# Patient Record
Sex: FEMALE | Race: WHITE | NOT HISPANIC OR LATINO | URBAN - METROPOLITAN AREA
[De-identification: names, ages, dates, MRNs, and addresses within clinical notes are randomized per-mention and may not be internally consistent; named-entity substitution may affect disease eponyms.]

---

## 2017-06-03 ENCOUNTER — EMERGENCY (EMERGENCY)
Facility: HOSPITAL | Age: 77
LOS: 1 days | End: 2017-06-03
Payer: MEDICARE

## 2017-06-03 PROCEDURE — 74177 CT ABD & PELVIS W/CONTRAST: CPT | Mod: 26

## 2017-06-03 PROCEDURE — 99284 EMERGENCY DEPT VISIT MOD MDM: CPT

## 2017-12-30 ENCOUNTER — INPATIENT (INPATIENT)
Facility: HOSPITAL | Age: 77
LOS: 0 days | Discharge: HOME | End: 2017-12-31
Payer: MEDICARE

## 2017-12-30 ENCOUNTER — OUTPATIENT (OUTPATIENT)
Dept: OUTPATIENT SERVICES | Facility: HOSPITAL | Age: 77
LOS: 1 days | End: 2017-12-30

## 2017-12-30 PROCEDURE — 70450 CT HEAD/BRAIN W/O DYE: CPT | Mod: 26

## 2017-12-30 PROCEDURE — 99285 EMERGENCY DEPT VISIT HI MDM: CPT

## 2017-12-30 PROCEDURE — 71010: CPT | Mod: 26

## 2017-12-31 ENCOUNTER — OUTPATIENT (OUTPATIENT)
Dept: OUTPATIENT SERVICES | Facility: HOSPITAL | Age: 77
LOS: 1 days | End: 2017-12-31

## 2018-08-17 ENCOUNTER — OUTPATIENT (OUTPATIENT)
Dept: OUTPATIENT SERVICES | Facility: HOSPITAL | Age: 78
LOS: 1 days | End: 2018-08-17

## 2019-05-30 ENCOUNTER — APPOINTMENT (OUTPATIENT)
Dept: MAMMOGRAPHY | Facility: CLINIC | Age: 79
End: 2019-05-30

## 2019-06-13 ENCOUNTER — APPOINTMENT (OUTPATIENT)
Dept: ULTRASOUND IMAGING | Facility: CLINIC | Age: 79
End: 2019-06-13
Payer: MEDICARE

## 2019-06-13 ENCOUNTER — APPOINTMENT (OUTPATIENT)
Dept: RADIOLOGY | Facility: CLINIC | Age: 79
End: 2019-06-13

## 2019-06-13 ENCOUNTER — APPOINTMENT (OUTPATIENT)
Dept: MAMMOGRAPHY | Facility: CLINIC | Age: 79
End: 2019-06-13

## 2019-06-13 PROCEDURE — 76641 ULTRASOUND BREAST COMPLETE: CPT | Mod: 50

## 2019-06-13 PROCEDURE — 77080 DXA BONE DENSITY AXIAL: CPT

## 2019-06-13 PROCEDURE — 77063 BREAST TOMOSYNTHESIS BI: CPT

## 2019-06-13 PROCEDURE — 77067 SCR MAMMO BI INCL CAD: CPT

## 2019-10-28 ENCOUNTER — OUTPATIENT (OUTPATIENT)
Dept: OUTPATIENT SERVICES | Facility: HOSPITAL | Age: 79
LOS: 1 days | End: 2019-10-28

## 2019-12-26 ENCOUNTER — APPOINTMENT (OUTPATIENT)
Dept: RADIOLOGY | Facility: CLINIC | Age: 79
End: 2019-12-26
Payer: MEDICARE

## 2019-12-26 PROCEDURE — 71046 X-RAY EXAM CHEST 2 VIEWS: CPT

## 2019-12-26 PROCEDURE — 72074 X-RAY EXAM THORAC SPINE4/>VW: CPT

## 2020-05-29 ENCOUNTER — APPOINTMENT (OUTPATIENT)
Dept: RADIOLOGY | Facility: CLINIC | Age: 80
End: 2020-05-29
Payer: MEDICARE

## 2020-05-29 PROCEDURE — 72100 X-RAY EXAM L-S SPINE 2/3 VWS: CPT

## 2020-06-05 ENCOUNTER — APPOINTMENT (OUTPATIENT)
Dept: MAMMOGRAPHY | Facility: CLINIC | Age: 80
End: 2020-06-05
Payer: MEDICARE

## 2020-06-05 ENCOUNTER — APPOINTMENT (OUTPATIENT)
Dept: ULTRASOUND IMAGING | Facility: CLINIC | Age: 80
End: 2020-06-05

## 2020-06-05 PROCEDURE — 77067 SCR MAMMO BI INCL CAD: CPT

## 2020-06-05 PROCEDURE — 76641 ULTRASOUND BREAST COMPLETE: CPT | Mod: 50

## 2020-06-05 PROCEDURE — 77063 BREAST TOMOSYNTHESIS BI: CPT

## 2020-06-22 ENCOUNTER — OUTPATIENT (OUTPATIENT)
Dept: OUTPATIENT SERVICES | Facility: HOSPITAL | Age: 80
LOS: 1 days | End: 2020-06-22

## 2020-07-03 ENCOUNTER — APPOINTMENT (OUTPATIENT)
Dept: DISASTER EMERGENCY | Facility: CLINIC | Age: 80
End: 2020-07-03

## 2020-07-03 DIAGNOSIS — Z01.818 ENCOUNTER FOR OTHER PREPROCEDURAL EXAMINATION: ICD-10-CM

## 2020-07-04 LAB — SARS-COV-2 N GENE NPH QL NAA+PROBE: NOT DETECTED

## 2020-07-06 ENCOUNTER — OUTPATIENT (OUTPATIENT)
Dept: OUTPATIENT SERVICES | Facility: HOSPITAL | Age: 80
LOS: 1 days | End: 2020-07-06

## 2020-07-06 ENCOUNTER — INPATIENT (INPATIENT)
Facility: HOSPITAL | Age: 80
LOS: 2 days | Discharge: HOME HEALTH | End: 2020-07-09
Payer: MEDICARE

## 2020-07-06 PROCEDURE — 73560 X-RAY EXAM OF KNEE 1 OR 2: CPT | Mod: 26,RT

## 2020-07-07 ENCOUNTER — OUTPATIENT (OUTPATIENT)
Dept: OUTPATIENT SERVICES | Facility: HOSPITAL | Age: 80
LOS: 1 days | End: 2020-07-07

## 2020-07-08 ENCOUNTER — OUTPATIENT (OUTPATIENT)
Dept: OUTPATIENT SERVICES | Facility: HOSPITAL | Age: 80
LOS: 1 days | End: 2020-07-08

## 2020-07-09 ENCOUNTER — OUTPATIENT (OUTPATIENT)
Dept: OUTPATIENT SERVICES | Facility: HOSPITAL | Age: 80
LOS: 1 days | End: 2020-07-09

## 2020-08-11 ENCOUNTER — APPOINTMENT (OUTPATIENT)
Dept: ULTRASOUND IMAGING | Facility: CLINIC | Age: 80
End: 2020-08-11
Payer: MEDICARE

## 2020-08-11 PROCEDURE — 76642 ULTRASOUND BREAST LIMITED: CPT | Mod: LT

## 2020-11-12 ENCOUNTER — APPOINTMENT (OUTPATIENT)
Dept: ULTRASOUND IMAGING | Facility: CLINIC | Age: 80
End: 2020-11-12
Payer: MEDICARE

## 2020-11-12 ENCOUNTER — RESULT REVIEW (OUTPATIENT)
Age: 80
End: 2020-11-12

## 2020-11-12 PROCEDURE — 76770 US EXAM ABDO BACK WALL COMP: CPT

## 2021-01-16 ENCOUNTER — APPOINTMENT (OUTPATIENT)
Dept: RADIOLOGY | Facility: CLINIC | Age: 81
End: 2021-01-16
Payer: MEDICARE

## 2021-01-16 PROCEDURE — 71045 X-RAY EXAM CHEST 1 VIEW: CPT

## 2021-07-19 ENCOUNTER — APPOINTMENT (OUTPATIENT)
Dept: ULTRASOUND IMAGING | Facility: CLINIC | Age: 81
End: 2021-07-19

## 2021-07-19 ENCOUNTER — APPOINTMENT (OUTPATIENT)
Dept: MAMMOGRAPHY | Facility: CLINIC | Age: 81
End: 2021-07-19
Payer: MEDICARE

## 2021-07-19 PROCEDURE — 77063 BREAST TOMOSYNTHESIS BI: CPT

## 2021-07-19 PROCEDURE — 76641 ULTRASOUND BREAST COMPLETE: CPT | Mod: 50

## 2021-07-19 PROCEDURE — 77067 SCR MAMMO BI INCL CAD: CPT

## 2022-06-17 ENCOUNTER — NON-APPOINTMENT (OUTPATIENT)
Age: 82
End: 2022-06-17

## 2022-06-18 ENCOUNTER — EMERGENCY (EMERGENCY)
Facility: HOSPITAL | Age: 82
LOS: 1 days | Discharge: HOME | End: 2022-06-18
Admitting: EMERGENCY MEDICINE
Payer: MEDICARE

## 2022-06-18 DIAGNOSIS — M54.9 DORSALGIA, UNSPECIFIED: ICD-10-CM

## 2022-06-18 DIAGNOSIS — I12.9 HYPERTENSIVE CHRONIC KIDNEY DISEASE WITH STAGE 1 THROUGH STAGE 4 CHRONIC KIDNEY DISEASE, OR UNSPECIFIED CHRONIC KIDNEY DISEASE: ICD-10-CM

## 2022-06-18 DIAGNOSIS — R31.29 OTHER MICROSCOPIC HEMATURIA: ICD-10-CM

## 2022-06-18 DIAGNOSIS — N18.9 CHRONIC KIDNEY DISEASE, UNSPECIFIED: ICD-10-CM

## 2022-06-18 DIAGNOSIS — R10.9 UNSPECIFIED ABDOMINAL PAIN: ICD-10-CM

## 2022-06-18 PROCEDURE — 99284 EMERGENCY DEPT VISIT MOD MDM: CPT

## 2022-06-18 PROCEDURE — 74176 CT ABD & PELVIS W/O CONTRAST: CPT | Mod: 26,MH

## 2022-07-22 ENCOUNTER — APPOINTMENT (OUTPATIENT)
Dept: ULTRASOUND IMAGING | Facility: CLINIC | Age: 82
End: 2022-07-22

## 2022-07-22 ENCOUNTER — APPOINTMENT (OUTPATIENT)
Dept: MAMMOGRAPHY | Facility: CLINIC | Age: 82
End: 2022-07-22

## 2022-07-22 PROCEDURE — 76641 ULTRASOUND BREAST COMPLETE: CPT | Mod: 50

## 2022-07-22 PROCEDURE — 77067 SCR MAMMO BI INCL CAD: CPT

## 2022-07-22 PROCEDURE — 77063 BREAST TOMOSYNTHESIS BI: CPT

## 2022-09-01 ENCOUNTER — APPOINTMENT (OUTPATIENT)
Dept: RADIOLOGY | Facility: CLINIC | Age: 82
End: 2022-09-01

## 2022-09-01 PROCEDURE — 77080 DXA BONE DENSITY AXIAL: CPT

## 2022-11-11 ENCOUNTER — RX ONLY (RX ONLY)
Age: 82
End: 2022-11-11

## 2022-11-11 ENCOUNTER — OFFICE (OUTPATIENT)
Dept: URBAN - METROPOLITAN AREA CLINIC 38 | Facility: CLINIC | Age: 82
Setting detail: OPHTHALMOLOGY
End: 2022-11-11
Payer: MEDICARE

## 2022-11-11 DIAGNOSIS — H02.825: ICD-10-CM

## 2022-11-11 PROCEDURE — 92285 EXTERNAL OCULAR PHOTOGRAPHY: CPT | Performed by: OPHTHALMOLOGY

## 2022-11-11 PROCEDURE — 67840 REMOVE EYELID LESION: CPT | Performed by: OPHTHALMOLOGY

## 2022-11-11 ASSESSMENT — KERATOMETRY
OS_AXISANGLE_DEGREES: 099
OS_K2POWER_DIOPTERS: 42.75
OS_K1POWER_DIOPTERS: 42.25
OD_AXISANGLE_DEGREES: 090
METHOD_AUTO_MANUAL: AUTO
OD_K2POWER_DIOPTERS: 42.75
OD_K1POWER_DIOPTERS: 42.75

## 2022-11-11 ASSESSMENT — CONFRONTATIONAL VISUAL FIELD TEST (CVF)
OS_FINDINGS: FULL
OD_FINDINGS: FULL

## 2022-11-11 ASSESSMENT — REFRACTION_AUTOREFRACTION
OS_CYLINDER: -0.50
OD_CYLINDER: -0.75
OS_SPHERE: +0.75
OS_AXIS: 087
OD_SPHERE: +0.75
OD_AXIS: 088

## 2022-11-11 ASSESSMENT — SUPERFICIAL PUNCTATE KERATITIS (SPK)
OD_SPK: T 1+
OS_SPK: T 1+

## 2022-11-11 ASSESSMENT — AXIALLENGTH_DERIVED
OD_AL: 23.722
OS_AL: 23.7657

## 2022-11-11 ASSESSMENT — REFRACTION_CURRENTRX
OD_OVR_VA: 20/
OD_ADD: +2.50
OD_VPRISM_DIRECTION: SV
OS_VPRISM_DIRECTION: SV
OS_ADD: +2.50
OS_OVR_VA: 20/

## 2022-11-11 ASSESSMENT — LID POSITION - DERMATOCHALASIS
OD_DERMATOCHALASIS: T
OS_DERMATOCHALASIS: T

## 2022-11-11 ASSESSMENT — TEAR BREAK UP TIME (TBUT)
OS_TBUT: 10 SECS
OD_TBUT: 10 SECS

## 2022-11-11 ASSESSMENT — SPHEQUIV_DERIVED
OD_SPHEQUIV: 0.375
OS_SPHEQUIV: 0.5

## 2022-11-11 ASSESSMENT — VISUAL ACUITY
OD_BCVA: 20/25+
OS_BCVA: 20/25+2

## 2022-11-11 ASSESSMENT — LID EXAM ASSESSMENTS
OD_BLEPHARITIS: RLL RUL 1+ 2+
OS_BLEPHARITIS: LLL LUL 1+ 2+

## 2022-11-22 ENCOUNTER — OFFICE (OUTPATIENT)
Dept: URBAN - METROPOLITAN AREA CLINIC 100 | Facility: CLINIC | Age: 82
Setting detail: OPHTHALMOLOGY
End: 2022-11-22
Payer: MEDICARE

## 2022-11-22 VITALS — HEIGHT: 55 IN

## 2022-11-22 DIAGNOSIS — H00.015: ICD-10-CM

## 2022-11-22 DIAGNOSIS — H02.825: ICD-10-CM

## 2022-11-22 PROBLEM — H01.002 BLEPHARITIS; RIGHT UPPER LID, RIGHT LOWER LID, LEFT UPPER LID, LEFT LOWER LID: Status: ACTIVE | Noted: 2022-11-22

## 2022-11-22 PROBLEM — H01.001 BLEPHARITIS; RIGHT UPPER LID, RIGHT LOWER LID, LEFT UPPER LID, LEFT LOWER LID: Status: ACTIVE | Noted: 2022-11-22

## 2022-11-22 PROBLEM — H02.83 DERMATOCHALASIS; RIGHT UPPER LID, LEFT UPPER LID: Status: ACTIVE | Noted: 2022-11-11

## 2022-11-22 PROBLEM — H01.005 BLEPHARITIS; RIGHT UPPER LID, RIGHT LOWER LID, LEFT UPPER LID, LEFT LOWER LID: Status: ACTIVE | Noted: 2022-11-22

## 2022-11-22 PROBLEM — H01.004 BLEPHARITIS; RIGHT UPPER LID, RIGHT LOWER LID, LEFT UPPER LID, LEFT LOWER LID: Status: ACTIVE | Noted: 2022-11-22

## 2022-11-22 PROCEDURE — 99213 OFFICE O/P EST LOW 20 MIN: CPT | Performed by: OPHTHALMOLOGY

## 2022-11-22 ASSESSMENT — KERATOMETRY
OD_K1POWER_DIOPTERS: 42.75
OS_K2POWER_DIOPTERS: 42.75
OD_K2POWER_DIOPTERS: 42.75
OD_AXISANGLE_DEGREES: 090
OS_K1POWER_DIOPTERS: 42.25
OS_AXISANGLE_DEGREES: 099
METHOD_AUTO_MANUAL: AUTO

## 2022-11-22 ASSESSMENT — VISUAL ACUITY
OD_BCVA: 20/30
OS_BCVA: 20/25-1

## 2022-11-22 ASSESSMENT — SPHEQUIV_DERIVED
OD_SPHEQUIV: 0.375
OS_SPHEQUIV: 0.5

## 2022-11-22 ASSESSMENT — REFRACTION_AUTOREFRACTION
OD_AXIS: 088
OD_CYLINDER: -0.75
OS_AXIS: 087
OD_SPHERE: +0.75
OS_CYLINDER: -0.50
OS_SPHERE: +0.75

## 2022-11-22 ASSESSMENT — AXIALLENGTH_DERIVED
OS_AL: 23.7657
OD_AL: 23.722

## 2022-11-22 ASSESSMENT — CONFRONTATIONAL VISUAL FIELD TEST (CVF)
OS_FINDINGS: FULL
OD_FINDINGS: FULL

## 2022-11-22 ASSESSMENT — TEAR BREAK UP TIME (TBUT)
OS_TBUT: 10 SECS
OD_TBUT: 10 SECS

## 2022-11-22 ASSESSMENT — LID EXAM ASSESSMENTS
OS_BLEPHARITIS: LLL LUL 1+ 2+
OD_BLEPHARITIS: RLL RUL 1+ 2+

## 2022-11-22 ASSESSMENT — SUPERFICIAL PUNCTATE KERATITIS (SPK)
OS_SPK: T 1+
OD_SPK: T 1+

## 2022-11-22 ASSESSMENT — LID POSITION - DERMATOCHALASIS
OS_DERMATOCHALASIS: T
OD_DERMATOCHALASIS: T

## 2022-12-15 PROBLEM — H16.222 DRY EYE SYNDROME K SICCA; LEFT EYE: Status: ACTIVE | Noted: 2022-12-15

## 2023-02-16 ENCOUNTER — OFFICE (OUTPATIENT)
Dept: URBAN - METROPOLITAN AREA CLINIC 38 | Facility: CLINIC | Age: 83
Setting detail: OPHTHALMOLOGY
End: 2023-02-16
Payer: MEDICARE

## 2023-02-16 DIAGNOSIS — H02.831: ICD-10-CM

## 2023-02-16 DIAGNOSIS — H02.834: ICD-10-CM

## 2023-02-16 DIAGNOSIS — Z96.1: ICD-10-CM

## 2023-02-16 DIAGNOSIS — H35.033: ICD-10-CM

## 2023-02-16 DIAGNOSIS — H16.223: ICD-10-CM

## 2023-02-16 PROCEDURE — 92014 COMPRE OPH EXAM EST PT 1/>: CPT | Performed by: OPHTHALMOLOGY

## 2023-02-16 ASSESSMENT — SPHEQUIV_DERIVED
OS_SPHEQUIV: 0.5
OD_SPHEQUIV: 0.375

## 2023-02-16 ASSESSMENT — REFRACTION_CURRENTRX
OD_OVR_VA: 20/
OD_SPHERE: PLANO
OS_SPHERE: PLANO
OD_ADD: +2.50
OS_OVR_VA: 20/
OS_VPRISM_DIRECTION: BF
OS_ADD: +2.50
OS_CYLINDER: SPHERE
OD_VPRISM_DIRECTION: BF
OD_CYLINDER: SPHERE

## 2023-02-16 ASSESSMENT — REFRACTION_MANIFEST
OU_VA: 20/20
OS_ADD: +2.75
OD_SPHERE: PLANO
OD_VA1: 20/20-
OS_SPHERE: PLANO
OS_CYLINDER: SPHERE
OD_CYLINDER: SPHERE
OS_VA2: 20/25
OD_CYLINDER: SPHERE
OS_VA1: 20/20
OD_ADD: +2.75
OS_CYLINDER: SPHERE
OD_ADD: +2.75
OD_SPHERE: PLANO
OD_VA2: 20/25
OD_VA1: 20/20-
OS_VA1: 20/20
OS_VA2: 20/25
OU_VA: 20/20
OS_SPHERE: PLANO
OD_VA2: 20/25
OS_ADD: +2.75

## 2023-02-16 ASSESSMENT — LID EXAM ASSESSMENTS
OD_BLEPHARITIS: RLL RUL 1+ 2+
OS_BLEPHARITIS: LLL LUL 1+ 2+

## 2023-02-16 ASSESSMENT — KERATOMETRY
METHOD_AUTO_MANUAL: AUTO
OS_K2POWER_DIOPTERS: 42.75
OS_AXISANGLE_DEGREES: 074
OD_K2POWER_DIOPTERS: 2.75
OS_K1POWER_DIOPTERS: 42.25
OD_K1POWER_DIOPTERS: 42.75
OD_AXISANGLE_DEGREES: 090

## 2023-02-16 ASSESSMENT — REFRACTION_AUTOREFRACTION
OS_CYLINDER: -0.50
OD_CYLINDER: -0.75
OD_AXIS: 087
OS_AXIS: 090
OD_SPHERE: +0.75
OS_SPHERE: +0.75

## 2023-02-16 ASSESSMENT — TONOMETRY
OD_IOP_MMHG: 16
OS_IOP_MMHG: 16

## 2023-02-16 ASSESSMENT — CONFRONTATIONAL VISUAL FIELD TEST (CVF)
OS_FINDINGS: FULL
OD_FINDINGS: FULL

## 2023-02-16 ASSESSMENT — TEAR BREAK UP TIME (TBUT)
OD_TBUT: 6-8 SECS
OS_TBUT: 6-8 SECS

## 2023-02-16 ASSESSMENT — VISUAL ACUITY
OD_BCVA: 20/20
OS_BCVA: 20/20-3

## 2023-02-16 ASSESSMENT — SUPERFICIAL PUNCTATE KERATITIS (SPK)
OS_SPK: T
OD_SPK: T

## 2023-02-16 ASSESSMENT — AXIALLENGTH_DERIVED
OD_AL: 34.535
OS_AL: 23.7657

## 2023-02-16 ASSESSMENT — LID POSITION - DERMATOCHALASIS
OS_DERMATOCHALASIS: T
OD_DERMATOCHALASIS: T

## 2023-03-01 ENCOUNTER — APPOINTMENT (OUTPATIENT)
Dept: ULTRASOUND IMAGING | Facility: CLINIC | Age: 83
End: 2023-03-01
Payer: MEDICARE

## 2023-03-01 PROCEDURE — 76856 US EXAM PELVIC COMPLETE: CPT

## 2023-06-22 ENCOUNTER — APPOINTMENT (OUTPATIENT)
Dept: CT IMAGING | Facility: CLINIC | Age: 83
End: 2023-06-22
Payer: MEDICARE

## 2023-06-22 PROCEDURE — 74176 CT ABD & PELVIS W/O CONTRAST: CPT | Mod: MH

## 2023-07-12 ENCOUNTER — RESULT REVIEW (OUTPATIENT)
Age: 83
End: 2023-07-12

## 2023-07-12 ENCOUNTER — APPOINTMENT (OUTPATIENT)
Dept: UROLOGY | Facility: CLINIC | Age: 83
End: 2023-07-12
Payer: MEDICARE

## 2023-07-12 VITALS
BODY MASS INDEX: 26.81 KG/M2 | HEIGHT: 61 IN | HEART RATE: 86 BPM | WEIGHT: 142 LBS | DIASTOLIC BLOOD PRESSURE: 82 MMHG | SYSTOLIC BLOOD PRESSURE: 138 MMHG | TEMPERATURE: 93.5 F

## 2023-07-12 DIAGNOSIS — K21.9 GASTRO-ESOPHAGEAL REFLUX DISEASE W/OUT ESOPHAGITIS: ICD-10-CM

## 2023-07-12 DIAGNOSIS — Z86.39 PERSONAL HISTORY OF OTHER ENDOCRINE, NUTRITIONAL AND METABOLIC DISEASE: ICD-10-CM

## 2023-07-12 DIAGNOSIS — Z85.828 PERSONAL HISTORY OF OTHER MALIGNANT NEOPLASM OF SKIN: ICD-10-CM

## 2023-07-12 DIAGNOSIS — N39.0 URINARY TRACT INFECTION, SITE NOT SPECIFIED: ICD-10-CM

## 2023-07-12 DIAGNOSIS — R73.03 PREDIABETES.: ICD-10-CM

## 2023-07-12 DIAGNOSIS — Z78.9 OTHER SPECIFIED HEALTH STATUS: ICD-10-CM

## 2023-07-12 DIAGNOSIS — D72.819 DECREASED WHITE BLOOD CELL COUNT, UNSPECIFIED: ICD-10-CM

## 2023-07-12 DIAGNOSIS — Z80.0 FAMILY HISTORY OF MALIGNANT NEOPLASM OF DIGESTIVE ORGANS: ICD-10-CM

## 2023-07-12 DIAGNOSIS — Z86.79 PERSONAL HISTORY OF OTHER DISEASES OF THE CIRCULATORY SYSTEM: ICD-10-CM

## 2023-07-12 PROCEDURE — 99203 OFFICE O/P NEW LOW 30 MIN: CPT

## 2023-07-12 RX ORDER — FENOFIBRATE 160 MG/1
160 TABLET ORAL
Refills: 0 | Status: ACTIVE | COMMUNITY

## 2023-07-12 RX ORDER — HYDROCHLOROTHIAZIDE 12.5 MG/1
12.5 CAPSULE ORAL
Refills: 0 | Status: ACTIVE | COMMUNITY

## 2023-07-12 RX ORDER — TELMISARTAN 40 MG/1
40 TABLET ORAL
Refills: 0 | Status: ACTIVE | COMMUNITY

## 2023-07-12 RX ORDER — FAMOTIDINE 40 MG/1
40 TABLET, FILM COATED ORAL
Refills: 0 | Status: ACTIVE | COMMUNITY

## 2023-07-12 RX ORDER — BACILLUS COAGULANS/INULIN 1B-250 MG
CAPSULE ORAL
Refills: 0 | Status: ACTIVE | COMMUNITY

## 2023-07-12 RX ORDER — MULTIVIT-MIN/IRON/FOLIC ACID/K 18-600-40
CAPSULE ORAL
Refills: 0 | Status: ACTIVE | COMMUNITY

## 2023-07-12 NOTE — ASSESSMENT
[FreeTextEntry1] : Hematuria:\par 2 neg cystoscopies\par will obtain cystoscopy\par MR urogram\par cytology\par if all neg, advised to f/u with gynecology

## 2023-07-12 NOTE — HISTORY OF PRESENT ILLNESS
[FreeTextEntry1] : 83yo F hx of CKD III, hematuria presents to establish care\par She states she has 2 cystoscopies with Dr. Enriquez for hematuria, last cystoscopy 2022 - neg findings\par Pt also had CT abd/pel no IV contrast on 6/2023: didn't show any stones or hydro.\par She noted blood after wiping. Didn't notice any in toilet bowl\par UCx neg in june 2023

## 2023-07-14 LAB — URINE CYTOLOGY: NORMAL

## 2023-07-27 ENCOUNTER — APPOINTMENT (OUTPATIENT)
Dept: OBGYN | Facility: CLINIC | Age: 83
End: 2023-07-27
Payer: MEDICARE

## 2023-07-27 VITALS
DIASTOLIC BLOOD PRESSURE: 88 MMHG | HEIGHT: 61 IN | BODY MASS INDEX: 26.81 KG/M2 | WEIGHT: 142 LBS | SYSTOLIC BLOOD PRESSURE: 128 MMHG

## 2023-07-27 DIAGNOSIS — Z01.419 ENCOUNTER FOR GYNECOLOGICAL EXAMINATION (GENERAL) (ROUTINE) W/OUT ABNORMAL FINDINGS: ICD-10-CM

## 2023-07-27 DIAGNOSIS — N83.201 UNSPECIFIED OVARIAN CYST, RIGHT SIDE: ICD-10-CM

## 2023-07-27 DIAGNOSIS — N90.89 OTHER SPECIFIED NONINFLAMMATORY DISORDERS OF VULVA AND PERINEUM: ICD-10-CM

## 2023-07-27 PROCEDURE — 99203 OFFICE O/P NEW LOW 30 MIN: CPT | Mod: 25

## 2023-07-27 PROCEDURE — 36415 COLL VENOUS BLD VENIPUNCTURE: CPT

## 2023-07-27 PROCEDURE — G0101: CPT

## 2023-07-27 RX ORDER — CLOBETASOL PROPIONATE 0.5 MG/G
0.05 CREAM TOPICAL TWICE DAILY
Qty: 1 | Refills: 1 | Status: ACTIVE | COMMUNITY
Start: 2023-07-27 | End: 1900-01-01

## 2023-07-27 NOTE — DISCUSSION/SUMMARY
[FreeTextEntry1] : 82 year old PMF here for wwe, vulvar irritation, r ov cysts\par -  f/u mri, ova 1 plus done today\par - cervical cancer screening not needed\par - sbe reveiwed, Mammo/sono RX given\par - Dexa UTD, vitamin D and calcium, weight bearing exercises recommended\par - colonoscopyUTD\par - clobetasol for 2 weeks\par - n/v 1 year for annual or prn\par - postmenopausal bleeding precautions given\par

## 2023-07-27 NOTE — PHYSICAL EXAM
[Appropriately responsive] : appropriately responsive [Alert] : alert [No Acute Distress] : no acute distress [No Lymphadenopathy] : no lymphadenopathy [Regular Rate Rhythm] : regular rate rhythm [Soft] : soft [Non-tender] : non-tender [Non-distended] : non-distended [No HSM] : No HSM [No Lesions] : no lesions [No Mass] : no mass [Oriented x3] : oriented x3 [Examination Of The Breasts] : a normal appearance [No Masses] : no breast masses were palpable [Labia Majora Erythema] : erythema [Labia Minora] : normal [Normal] : normal [Absent] : absent [Uterine Adnexae] : normal

## 2023-08-01 ENCOUNTER — OUTPATIENT (OUTPATIENT)
Dept: OUTPATIENT SERVICES | Facility: HOSPITAL | Age: 83
LOS: 1 days | End: 2023-08-01
Payer: MEDICARE

## 2023-08-01 ENCOUNTER — APPOINTMENT (OUTPATIENT)
Dept: MRI IMAGING | Facility: CLINIC | Age: 83
End: 2023-08-01
Payer: MEDICARE

## 2023-08-01 DIAGNOSIS — R31.0 GROSS HEMATURIA: ICD-10-CM

## 2023-08-01 PROCEDURE — 72197 MRI PELVIS W/O & W/DYE: CPT | Mod: 26,MH

## 2023-08-01 PROCEDURE — 74183 MRI ABD W/O CNTR FLWD CNTR: CPT | Mod: 26,MH

## 2023-08-01 PROCEDURE — A9585: CPT

## 2023-08-01 PROCEDURE — 72197 MRI PELVIS W/O & W/DYE: CPT

## 2023-08-01 PROCEDURE — 74183 MRI ABD W/O CNTR FLWD CNTR: CPT

## 2023-08-02 ENCOUNTER — RESULT REVIEW (OUTPATIENT)
Age: 83
End: 2023-08-02

## 2023-08-02 ENCOUNTER — APPOINTMENT (OUTPATIENT)
Dept: MAMMOGRAPHY | Facility: CLINIC | Age: 83
End: 2023-08-02
Payer: MEDICARE

## 2023-08-02 ENCOUNTER — TRANSCRIPTION ENCOUNTER (OUTPATIENT)
Age: 83
End: 2023-08-02

## 2023-08-02 ENCOUNTER — APPOINTMENT (OUTPATIENT)
Dept: ULTRASOUND IMAGING | Facility: CLINIC | Age: 83
End: 2023-08-02
Payer: MEDICARE

## 2023-08-02 LAB
CA125 (BIOREF): 9.6
DISCLAIMER (BIOREF): NORMAL
METHODSANDLIMITATIONS: NORMAL
OVA1PLUS: NORMAL
REFERENCES: NORMAL
RISK OF MALIGNANCY POSTMENOPAUSAL/HIGH RISK ULTRASOUND: NORMAL
RISK OF MALIGNANCY POSTMENOPAUSAL/LOW RISK ULTRASOUND: NORMAL
RISK OF MALIGNANCY PREMENOPAUSAL/HIGH RISK ULTRASOUND: NORMAL
RISK OF MALIGNANCY PREMENOPAUSAL/LOW RISK ULTRASOUND: NORMAL

## 2023-08-02 PROCEDURE — 76641 ULTRASOUND BREAST COMPLETE: CPT | Mod: 50,GY

## 2023-08-02 PROCEDURE — 77067 SCR MAMMO BI INCL CAD: CPT

## 2023-08-02 PROCEDURE — 77063 BREAST TOMOSYNTHESIS BI: CPT

## 2023-08-08 ENCOUNTER — APPOINTMENT (OUTPATIENT)
Dept: UROLOGY | Facility: CLINIC | Age: 83
End: 2023-08-08
Payer: MEDICARE

## 2023-08-08 VITALS
HEART RATE: 103 BPM | WEIGHT: 142 LBS | TEMPERATURE: 97.3 F | HEIGHT: 61 IN | DIASTOLIC BLOOD PRESSURE: 76 MMHG | BODY MASS INDEX: 26.81 KG/M2 | SYSTOLIC BLOOD PRESSURE: 124 MMHG

## 2023-08-08 DIAGNOSIS — R31.0 GROSS HEMATURIA: ICD-10-CM

## 2023-08-08 PROCEDURE — 52000 CYSTOURETHROSCOPY: CPT

## 2023-08-08 RX ORDER — CIPROFLOXACIN HYDROCHLORIDE 500 MG/1
500 TABLET, FILM COATED ORAL
Qty: 1 | Refills: 0 | Status: ACTIVE | COMMUNITY
Start: 2023-08-08

## 2023-08-08 RX ORDER — CIPROFLOXACIN HYDROCHLORIDE 500 MG/1
500 TABLET, FILM COATED ORAL
Refills: 0 | Status: COMPLETED | OUTPATIENT
Start: 2023-08-08

## 2023-08-08 RX ADMIN — Medication 0 MG: at 00:00

## 2023-08-24 ENCOUNTER — APPOINTMENT (OUTPATIENT)
Dept: ULTRASOUND IMAGING | Facility: CLINIC | Age: 83
End: 2023-08-24
Payer: MEDICARE

## 2023-08-24 PROCEDURE — 93971 EXTREMITY STUDY: CPT | Mod: LT

## 2023-09-15 ASSESSMENT — KOOS JR
IMPORTED KOOS JR SCORE: 100.0
STANDING UPRIGHT: MILD
KOOS JR RAW SCORE: 3
IMPORTED KOOS JR SCORE: 76.33
KOOS JR RAW SCORE: 17
HOW SEVERE IS YOUR KNEE STIFFNESS AFTER FIRST WAKING IN MORNING: SEVERE
GOING UP OR DOWN STAIRS: MILD
KOOS JR RAW SCORE: 17
RISING FROM SITTING: MODERATE
IMPORTED FORM: YES
BENDING TO THE FLOOR TO PICK UP OBJECT: MODERATE
KOOS JR RAW SCORE: 0
HOW SEVERE IS YOUR KNEE STIFFNESS AFTER FIRST WAKING IN MORNING: MILD
IMPORTED FORM: YES
RISING FROM SITTING: MODERATE
TWISING OR PIVOTING ON KNEE: MILD
RISING FROM SITTING: MILD
HOW SEVERE IS YOUR KNEE STIFFNESS AFTER FIRST WAKING IN MORNING: SEVERE
IMPORTED LATERALITY: RIGHT
TWISING OR PIVOTING ON KNEE: EXTREME
IMPORTED LATERALITY: RIGHT
TWISING OR PIVOTING ON KNEE: EXTREME
HOW SEVERE IS YOUR KNEE STIFFNESS AFTER FIRST WAKING IN MORNING: MILD
KOOS JR RAW SCORE: 0
KOOS JR RAW SCORE: 4
GOING UP OR DOWN STAIRS: SEVERE
IMPORTED FORM: YES
BENDING TO THE FLOOR TO PICK UP OBJECT: MODERATE
IMPORTED FORM: YES
IMPORTED LATERALITY: RIGHT
IMPORTED KOOS JR SCORE: 79.91
RISING FROM SITTING: MILD
IMPORTED KOOS JR SCORE: 79.91
STRAIGHTENING KNEE FULLY: MODERATE
IMPORTED KOOS JR SCORE: 44.9
GOING UP OR DOWN STAIRS: MILD
KOOS JR RAW SCORE: 4
TWISING OR PIVOTING ON KNEE: MILD
STANDING UPRIGHT: MILD
IMPORTED KOOS JR SCORE: 100.0
IMPORTED KOOS JR SCORE: 44.9
GOING UP OR DOWN STAIRS: SEVERE
IMPORTED LATERALITY: RIGHT
STRAIGHTENING KNEE FULLY: MODERATE
KOOS JR RAW SCORE: 3
IMPORTED KOOS JR SCORE: 76.33

## 2024-02-15 ENCOUNTER — RX ONLY (RX ONLY)
Age: 84
End: 2024-02-15

## 2024-02-15 ENCOUNTER — OFFICE (OUTPATIENT)
Dept: URBAN - METROPOLITAN AREA CLINIC 38 | Facility: CLINIC | Age: 84
Setting detail: OPHTHALMOLOGY
End: 2024-02-15
Payer: MEDICARE

## 2024-02-15 DIAGNOSIS — H90.3: ICD-10-CM

## 2024-02-15 DIAGNOSIS — H01.001: ICD-10-CM

## 2024-02-15 DIAGNOSIS — H02.834: ICD-10-CM

## 2024-02-15 DIAGNOSIS — H01.005: ICD-10-CM

## 2024-02-15 DIAGNOSIS — H16.223: ICD-10-CM

## 2024-02-15 DIAGNOSIS — H52.4: ICD-10-CM

## 2024-02-15 DIAGNOSIS — H35.033: ICD-10-CM

## 2024-02-15 DIAGNOSIS — H01.002: ICD-10-CM

## 2024-02-15 DIAGNOSIS — H02.831: ICD-10-CM

## 2024-02-15 DIAGNOSIS — H01.004: ICD-10-CM

## 2024-02-15 DIAGNOSIS — Z96.1: ICD-10-CM

## 2024-02-15 PROCEDURE — 92015 DETERMINE REFRACTIVE STATE: CPT | Performed by: OPHTHALMOLOGY

## 2024-02-15 PROCEDURE — 92557 COMPREHENSIVE HEARING TEST: CPT | Mod: AB | Performed by: AUDIOLOGIST

## 2024-02-15 PROCEDURE — 92567 TYMPANOMETRY: CPT | Mod: AB | Performed by: AUDIOLOGIST

## 2024-02-15 PROCEDURE — 92014 COMPRE OPH EXAM EST PT 1/>: CPT | Performed by: OPHTHALMOLOGY

## 2024-02-15 ASSESSMENT — REFRACTION_MANIFEST
OD_CYLINDER: -0.50
OS_CYLINDER: -0.50
OD_ADD: +2.75
OS_SPHERE: +0.50
OS_SPHERE: +0.75
OS_VA2: 20/20(J1+)
OD_VA1: 20/20
OD_VA1: 20/20
OD_SPHERE: +0.25
OU_VA: 20/20
OS_ADD: +2.75
OD_VA2: 20/20(J1+)
OD_ADD: +2.75
OD_CYLINDER: -0.50
OU_VA: 20/20
OD_SPHERE: +0.25
OS_VA1: 20/20
OS_AXIS: 085
OD_AXIS: 090
OS_VA1: 20/20
OD_VA2: 20/20(J1+)
OS_AXIS: 085
OD_AXIS: 090
OS_ADD: +2.75
OS_CYLINDER: -0.50
OS_VA2: 20/20(J1+)

## 2024-02-15 ASSESSMENT — LID EXAM ASSESSMENTS
OS_BLEPHARITIS: LLL LUL 1+ 2+
OD_BLEPHARITIS: RLL RUL 1+ 2+

## 2024-02-15 ASSESSMENT — TEAR BREAK UP TIME (TBUT)
OS_TBUT: 6-8 SECS
OD_TBUT: 6-8 SECS

## 2024-02-15 ASSESSMENT — REFRACTION_CURRENTRX
OD_SPHERE: PLANO
OS_OVR_VA: 20/
OD_OVR_VA: 20/
OS_CYLINDER: -0.25
OS_VPRISM_DIRECTION: BF
OD_VPRISM_DIRECTION: BF
OS_AXIS: 148
OS_ADD: +3.00
OD_ADD: +3.00
OS_SPHERE: +0.25

## 2024-02-15 ASSESSMENT — SPHEQUIV_DERIVED
OS_SPHEQUIV: 0.5
OS_SPHEQUIV: 0.25
OD_SPHEQUIV: 0
OD_SPHEQUIV: 0.125
OS_SPHEQUIV: 0.5
OD_SPHEQUIV: 0

## 2024-02-15 ASSESSMENT — SUPERFICIAL PUNCTATE KERATITIS (SPK)
OS_SPK: T
OD_SPK: T

## 2024-02-15 ASSESSMENT — REFRACTION_AUTOREFRACTION
OD_AXIS: 091
OS_SPHERE: +0.75
OS_AXIS: 087
OS_CYLINDER: -0.50
OD_CYLINDER: -0.75
OD_SPHERE: +0.50

## 2024-02-15 ASSESSMENT — LID POSITION - DERMATOCHALASIS
OS_DERMATOCHALASIS: T
OD_DERMATOCHALASIS: T

## 2024-02-15 ASSESSMENT — CONFRONTATIONAL VISUAL FIELD TEST (CVF)
OD_FINDINGS: FULL
OS_FINDINGS: FULL

## 2024-07-23 ENCOUNTER — APPOINTMENT (OUTPATIENT)
Dept: UROLOGY | Facility: CLINIC | Age: 84
End: 2024-07-23
Payer: MEDICARE

## 2024-07-23 VITALS
WEIGHT: 142 LBS | SYSTOLIC BLOOD PRESSURE: 157 MMHG | HEIGHT: 61 IN | DIASTOLIC BLOOD PRESSURE: 87 MMHG | BODY MASS INDEX: 26.81 KG/M2 | TEMPERATURE: 97.6 F | HEART RATE: 80 BPM

## 2024-07-23 DIAGNOSIS — N36.2 URETHRAL CARUNCLE: ICD-10-CM

## 2024-07-23 DIAGNOSIS — R31.0 GROSS HEMATURIA: ICD-10-CM

## 2024-07-23 PROCEDURE — 99213 OFFICE O/P EST LOW 20 MIN: CPT

## 2024-07-23 RX ORDER — ESTRADIOL 0.1 MG/G
0.1 CREAM VAGINAL
Qty: 1 | Refills: 1 | Status: ACTIVE | COMMUNITY
Start: 2024-07-23 | End: 1900-01-01

## 2024-07-23 NOTE — HISTORY OF PRESENT ILLNESS
[FreeTextEntry1] : 82yo F hx of CKD III, hematuria presents for f/u She states she had another episode of hematuria 6/2024. UCx was neg.   Neg cysto 8/2023: showed urethral caruncle She states she has 2 cystoscopies with Dr. Enriquez for hematuria, last cystoscopy 2022 - neg findings Pt also had CT abd/pel no IV contrast on 6/2023: didn't show any stones or hydro. She noted blood after wiping. Didn't notice any in toilet bowl

## 2024-07-23 NOTE — ASSESSMENT
[FreeTextEntry1] : Hematuria: last neg cysto 8/2023 discussed likely due to urethral caruncle will prescribe estrace cream to apply once a week

## 2024-07-26 LAB — URINE CYTOLOGY: NORMAL

## 2024-07-30 ENCOUNTER — APPOINTMENT (OUTPATIENT)
Dept: ULTRASOUND IMAGING | Facility: CLINIC | Age: 84
End: 2024-07-30
Payer: MEDICARE

## 2024-07-30 PROCEDURE — 93971 EXTREMITY STUDY: CPT | Mod: LT

## 2024-08-20 ENCOUNTER — APPOINTMENT (OUTPATIENT)
Dept: UROLOGY | Facility: CLINIC | Age: 84
End: 2024-08-20
Payer: MEDICARE

## 2024-08-20 VITALS
HEART RATE: 92 BPM | BODY MASS INDEX: 26.81 KG/M2 | TEMPERATURE: 97.3 F | WEIGHT: 142 LBS | HEIGHT: 61 IN | SYSTOLIC BLOOD PRESSURE: 155 MMHG | DIASTOLIC BLOOD PRESSURE: 81 MMHG

## 2024-08-20 DIAGNOSIS — N39.0 URINARY TRACT INFECTION, SITE NOT SPECIFIED: ICD-10-CM

## 2024-08-20 DIAGNOSIS — N36.2 URETHRAL CARUNCLE: ICD-10-CM

## 2024-08-20 LAB
APPEARANCE: CLEAR
BILIRUBIN URINE: NEGATIVE
BLOOD URINE: NEGATIVE
COLOR: YELLOW
GLUCOSE QUALITATIVE U: NEGATIVE
KETONES URINE: NEGATIVE
LEUKOCYTE ESTERASE URINE: NEGATIVE
NITRITE URINE: NEGATIVE
PH URINE: 7
PROTEIN URINE: NEGATIVE
SPECIFIC GRAVITY URINE: 1.01
UROBILINOGEN URINE: 0.2 (ref 0.2–?)

## 2024-08-20 PROCEDURE — 99214 OFFICE O/P EST MOD 30 MIN: CPT

## 2024-08-20 NOTE — ASSESSMENT
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 Advised to start using 3x/week which can also help with UTIs

## 2024-08-20 NOTE — HISTORY OF PRESENT ILLNESS
[FreeTextEntry1] : 84yo F hx of CKD III, hematuria presents for f/u SHe wants to r/o UTI She had another UTI earlier this month. Had little hematuria and discomfort   Neg cysto 8/2023: showed urethral caruncle She states she has 2 cystoscopies with Dr. Enriquez for hematuria, last cystoscopy 2022 - neg findings Pt also had CT abd/pel no IV contrast on 6/2023: didn't show any stones or hydro. She noted blood after wiping. Didn't notice any in toilet bowl

## 2024-08-26 LAB — BACTERIA UR CULT: ABNORMAL

## 2024-08-26 RX ORDER — AMOXICILLIN AND CLAVULANATE POTASSIUM 875; 125 MG/1; MG/1
875-125 TABLET, COATED ORAL
Qty: 14 | Refills: 0 | Status: ACTIVE | COMMUNITY
Start: 2024-08-26 | End: 1900-01-01

## 2024-09-11 ENCOUNTER — APPOINTMENT (OUTPATIENT)
Dept: UROLOGY | Facility: CLINIC | Age: 84
End: 2024-09-11
Payer: MEDICARE

## 2024-09-11 VITALS
TEMPERATURE: 96 F | HEART RATE: 67 BPM | SYSTOLIC BLOOD PRESSURE: 145 MMHG | HEIGHT: 61 IN | DIASTOLIC BLOOD PRESSURE: 88 MMHG | WEIGHT: 142 LBS | BODY MASS INDEX: 26.81 KG/M2

## 2024-09-11 DIAGNOSIS — N36.2 URETHRAL CARUNCLE: ICD-10-CM

## 2024-09-11 DIAGNOSIS — N39.0 URINARY TRACT INFECTION, SITE NOT SPECIFIED: ICD-10-CM

## 2024-09-11 PROCEDURE — 99214 OFFICE O/P EST MOD 30 MIN: CPT

## 2024-09-11 NOTE — HISTORY OF PRESENT ILLNESS
[FreeTextEntry1] : 84yo F hx of CKD III presents for f/u She finished course of abx now feeling fine.   Neg cysto 8/2023: showed urethral caruncle She states she has 2 cystoscopies with Dr. Enriquez for hematuria, last cystoscopy 2022 - neg findings Pt also had CT abd/pel no IV contrast on 6/2023: didn't show any stones or hydro.  She noted blood after wiping. Didn't notice any in toilet bowl on 6/2024

## 2024-09-11 NOTE — ASSESSMENT
[FreeTextEntry1] : Recurrent UTIs: recheck culture c/w probiotics c/w cranberry pills If she gets another UTI, will discuss long term abx unable to use methenamine due to CKD, unable to try D Mannose due to diarrhea side effect  Urethral caruncle: uses estrace cream 3x/week

## 2024-09-12 LAB — URINE CYTOLOGY: NORMAL

## 2024-09-16 LAB — BACTERIA UR CULT: NORMAL

## 2024-10-18 PROBLEM — E11.9 TYPE 2 DIABETES TYPE 2 NO RETINOPATHY: Status: ACTIVE | Noted: 2024-10-18

## 2024-11-04 ENCOUNTER — APPOINTMENT (OUTPATIENT)
Dept: MAMMOGRAPHY | Facility: CLINIC | Age: 84
End: 2024-11-04
Payer: MEDICARE

## 2024-11-04 PROCEDURE — 77067 SCR MAMMO BI INCL CAD: CPT

## 2024-11-04 PROCEDURE — 77063 BREAST TOMOSYNTHESIS BI: CPT

## 2024-11-22 PROBLEM — H25.13 CATARACT; RIGHT EYE, LEFT EYE, BOTH EYES: Status: ACTIVE | Noted: 2024-11-12

## 2024-11-22 PROBLEM — H25.11 CATARACT; RIGHT EYE, LEFT EYE, BOTH EYES: Status: ACTIVE | Noted: 2024-11-12

## 2024-11-22 PROBLEM — H25.12 CATARACT; RIGHT EYE, LEFT EYE, BOTH EYES: Status: ACTIVE | Noted: 2024-11-12

## 2025-02-20 ENCOUNTER — OFFICE (OUTPATIENT)
Dept: URBAN - METROPOLITAN AREA CLINIC 38 | Facility: CLINIC | Age: 85
Setting detail: OPHTHALMOLOGY
End: 2025-02-20
Payer: MEDICARE

## 2025-02-20 DIAGNOSIS — H35.033: ICD-10-CM

## 2025-02-20 DIAGNOSIS — H02.831: ICD-10-CM

## 2025-02-20 DIAGNOSIS — H02.834: ICD-10-CM

## 2025-02-20 DIAGNOSIS — Z96.1: ICD-10-CM

## 2025-02-20 DIAGNOSIS — H16.223: ICD-10-CM

## 2025-02-20 PROCEDURE — 92014 COMPRE OPH EXAM EST PT 1/>: CPT | Performed by: OPHTHALMOLOGY

## 2025-02-20 ASSESSMENT — REFRACTION_MANIFEST
OS_ADD: +3.00
OU_VA: 20/20
OD_VA2: 20/20(J1+)
OD_SPHERE: +0.25
OD_VA1: 20/20
OD_SPHERE: PLANO
OD_AXIS: 090
OS_SPHERE: +0.75
OD_VA1: 20/20-1
OS_VA1: 20/20
OS_SPHERE: +0.50
OS_AXIS: 085
OD_AXIS: 090
OD_CYLINDER: -0.50
OD_CYLINDER: -0.50
OD_ADD: +3.00
OS_VA2: 20/20(J1+)
OS_ADD: +2.75
OS_VA1: 20/20
OS_CYLINDER: -0.25
OU_VA: 20/20
OD_VA2: 20/20(J1+)
OS_CYLINDER: -0.50
OS_VA2: 20/20(J1+)
OS_AXIS: 090
OD_ADD: +2.75

## 2025-02-20 ASSESSMENT — SUPERFICIAL PUNCTATE KERATITIS (SPK)
OD_SPK: T
OS_SPK: T

## 2025-02-20 ASSESSMENT — REFRACTION_CURRENTRX
OD_OVR_VA: 20/
OS_ADD: +3.00
OS_SPHERE: PLANO
OD_CYLINDER: SPH
OS_OVR_VA: 20/
OD_SPHERE: PLANO
OS_VPRISM_DIRECTION: BF
OD_ADD: +3.00
OS_CYLINDER: SPH
OD_VPRISM_DIRECTION: BF

## 2025-02-20 ASSESSMENT — KERATOMETRY
OD_K1POWER_DIOPTERS: 42.75
OS_AXISANGLE_DEGREES: 134
OD_AXISANGLE_DEGREES: 090
OD_K2POWER_DIOPTERS: 42.75
METHOD_AUTO_MANUAL: AUTO
OS_K2POWER_DIOPTERS: 42.75
OS_K1POWER_DIOPTERS: 42.25

## 2025-02-20 ASSESSMENT — REFRACTION_AUTOREFRACTION
OD_AXIS: 092
OS_CYLINDER: -0.75
OD_CYLINDER: -1.00
OS_SPHERE: +1.00
OD_SPHERE: +0.75
OS_AXIS: 087

## 2025-02-20 ASSESSMENT — VISUAL ACUITY
OD_BCVA: 20/40
OS_BCVA: 20/25

## 2025-02-20 ASSESSMENT — TEAR BREAK UP TIME (TBUT)
OS_TBUT: 6-8 SECS
OD_TBUT: 6-8 SECS

## 2025-02-20 ASSESSMENT — LID POSITION - DERMATOCHALASIS
OD_DERMATOCHALASIS: T
OS_DERMATOCHALASIS: T

## 2025-02-20 ASSESSMENT — CONFRONTATIONAL VISUAL FIELD TEST (CVF)
OS_FINDINGS: FULL
OD_FINDINGS: FULL

## 2025-03-11 ENCOUNTER — APPOINTMENT (OUTPATIENT)
Dept: UROLOGY | Facility: CLINIC | Age: 85
End: 2025-03-11

## 2025-07-22 ENCOUNTER — APPOINTMENT (OUTPATIENT)
Dept: UROLOGY | Facility: CLINIC | Age: 85
End: 2025-07-22